# Patient Record
Sex: MALE | Race: WHITE | URBAN - METROPOLITAN AREA
[De-identification: names, ages, dates, MRNs, and addresses within clinical notes are randomized per-mention and may not be internally consistent; named-entity substitution may affect disease eponyms.]

---

## 2023-07-21 ENCOUNTER — TELEPHONE (OUTPATIENT)
Dept: GASTROENTEROLOGY | Facility: CLINIC | Age: 63
End: 2023-07-21

## 2023-07-21 RX ORDER — TRAZODONE HYDROCHLORIDE 50 MG/1
50 TABLET ORAL
COMMUNITY
Start: 2023-07-07

## 2023-07-21 RX ORDER — PREDNISONE 10 MG/1
TABLET ORAL
COMMUNITY
Start: 2023-07-07

## 2023-07-21 RX ORDER — CLONAZEPAM 1 MG/1
1 TABLET ORAL EVERY 6 HOURS PRN
COMMUNITY
Start: 2023-06-01

## 2023-07-21 RX ORDER — PANTOPRAZOLE SODIUM 40 MG/1
40 TABLET, DELAYED RELEASE ORAL EVERY MORNING
COMMUNITY
Start: 2023-06-01

## 2023-07-21 RX ORDER — ESCITALOPRAM OXALATE 5 MG/1
5 TABLET ORAL
COMMUNITY
Start: 2023-06-01

## 2023-08-03 ENCOUNTER — CONSULT (OUTPATIENT)
Dept: GASTROENTEROLOGY | Facility: CLINIC | Age: 63
End: 2023-08-03
Payer: COMMERCIAL

## 2023-08-03 ENCOUNTER — TELEPHONE (OUTPATIENT)
Dept: GASTROENTEROLOGY | Facility: CLINIC | Age: 63
End: 2023-08-03

## 2023-08-03 VITALS
BODY MASS INDEX: 22.24 KG/M2 | WEIGHT: 138.4 LBS | DIASTOLIC BLOOD PRESSURE: 78 MMHG | HEIGHT: 66 IN | SYSTOLIC BLOOD PRESSURE: 120 MMHG

## 2023-08-03 DIAGNOSIS — R63.0 LOSS OF APPETITE: ICD-10-CM

## 2023-08-03 DIAGNOSIS — R63.4 UNINTENTIONAL WEIGHT LOSS: ICD-10-CM

## 2023-08-03 DIAGNOSIS — Z98.890 HISTORY OF COLONOSCOPY: ICD-10-CM

## 2023-08-03 DIAGNOSIS — R19.4 CHANGE IN BOWEL HABITS: ICD-10-CM

## 2023-08-03 DIAGNOSIS — R11.0 NAUSEA: Primary | ICD-10-CM

## 2023-08-03 DIAGNOSIS — R10.10 PAIN OF UPPER ABDOMEN: ICD-10-CM

## 2023-08-03 PROCEDURE — 99204 OFFICE O/P NEW MOD 45 MIN: CPT | Performed by: NURSE PRACTITIONER

## 2023-08-03 RX ORDER — POLYETHYLENE GLYCOL 3350, SODIUM SULFATE ANHYDROUS, SODIUM BICARBONATE, SODIUM CHLORIDE, POTASSIUM CHLORIDE 236; 22.74; 6.74; 5.86; 2.97 G/4L; G/4L; G/4L; G/4L; G/4L
4000 POWDER, FOR SOLUTION ORAL ONCE
Qty: 4000 ML | Refills: 0 | Status: SHIPPED | OUTPATIENT
Start: 2023-08-03 | End: 2023-08-10 | Stop reason: HOSPADM

## 2023-08-03 RX ORDER — SUCRALFATE 1 G/1
TABLET ORAL
COMMUNITY
Start: 2023-06-05

## 2023-08-03 NOTE — TELEPHONE ENCOUNTER
Scheduled date of colonoscopy (as of today):8/10/23  Physician performing colonoscopy:RADHA  Location of colonoscopy:[facility]  Bowel prep reviewed with patient:Bi  Instructions reviewed with patient by:BRENNEN  Clearances: N

## 2023-08-03 NOTE — PROGRESS NOTES
[facility] - Outpatient Consultation  Bety Dye 65 y.o. male MRN: [id number]  Encounter: [id number]    ASSESSMENT AND PLAN:      1. Pain of upper abdomen  2. Nausea  Patient states he is having intermittent nausea with abdominal pain to his upper abdomen area that radiates to the left and right flank. Patient states he has increased discomfort with lying down. Patient does have history of gastritis noted on EGD a few years ago at [facility].  No record on EMR. Lab work normal. Patient's current setting with symptoms and 40+ pound weight loss in 4 months, consider gastritis, gastric versus peptic ulcer, ?malignancy. Patient states symptoms do feel slightly better with Carafate 1 g twice daily and omeprazole 20 mg daily. Evaluating both colonoscopy and EGD. He states he did have a cardiac work-up which was normal.    - Frequent small meals  - EGD scheduled at [facility]    3. Loss of appetite  4. Unintentional weight loss  Patient states he has had loss of appetite and unintentional weight loss for approximately 4 to 5 months. Believes he is lost approximately 42 pounds. States he cannot finish a meal.  Sooner malabsorption versus malnutrition, ? Malignancy. -EGD/colonoscopy scheduled at [facility]    5. Change in bowel habit  Patient states he is having daily or every other day bowel movements however states his bowel movements are soft and he believes thinner in caliber than previously. He denies hematochezia or melena. Scheduling for colonoscopy. 6. History of colonoscopy  Colonoscopy 2013; 5-year recall. Patient has opted to do Cologuard x2 with the last one being 11/2022 and negative. - Colonoscopy scheduled at [facility]; Future  - polyethylene glycol (Golytely) 4000 mL solution; Take 4,000 mL by mouth once for 1 dose Take 4000 mL by mouth once for 1 dose.  Use as directed  Dispense: 4000 mL; Refill: 0      Followup Appointment: 3 to 4 weeks after procedure  ______________________________________________________________________    Chief Complaint   Patient presents with   • Decreased Appetite     Pt states that he has had a decreased appetite for last 5 months. Also experiencing extreme fatigue and pain on his sides when laying down. He forces himself to eat something everyday. HPI:   Clint López is a 65y.o. year old male referred by PCP for increased weight loss, decreased appetite, nausea, abdominal discomfort and fatigue. On exam, patient intermittent nausea with eating, denies nausea vomiting. Denies any acid reflux symptoms however states since he has been taking omeprazole 20 mg daily and Carafate 1 g twice daily his symptoms have felt a little better. Denies abdominal pain on exam however states when he lies down he has increased abdominal discomfort that actually radiates bilaterally to his sides. It also gives him increased chest discomfort. Patient has been seen by the cardiologist and cleared. Patient states he is having soft daily or every other day bowel movements however states the caliber is thinner. Denies hematochezia or melena. Non-smoker, denies EtOH use. Patient states he has lost approximately 42 pounds in the last 4 months. Patient did have blood work completed by his PCP which essentially was negative, CBC, CMP, TSH, CRP.;  Chest x-ray essentially was normal per patient and also MRI was completed and patient states he was told it was normal as well. Patient does have a history of having an EGD he states approximately 9 years ago that noted gastritis. Colonoscopy 2013 by Dr. Bernardino Aldridge at [facility] noted internal hemorrhoid, poor prep and was requested for 5-year follow-up. Patient decided to do Cologuard instead. He has done Cologuard twice last one being 11/2022 that was negative.     Historical Information   Past Medical History:   Diagnosis Date   • Anxiety 07/21/2023    from PCP   • Bloating 07/21/2023 per PCP   • Change in bowel habits     per PCP   • Elevated LFTs 07/21/2023    from PCP   • High cholesterol 07/21/2023    from PCP   • Stomach ulcer 07/21/2023    from PCP     Past Surgical History:   Procedure Laterality Date   • HYDROCELE EXCISION / REPAIR Right 10/16/2021    testicular hydrocele/epididymitis   • SALIVARY GLAND SURGERY  10/2018    [facility]     Social History     Substance and Sexual Activity   Alcohol Use Never     Social History     Substance and Sexual Activity   Drug Use Never     Social History     Tobacco Use   Smoking Status Never   Smokeless Tobacco Never     Family History   Problem Relation Age of Onset   • No Known Problems Mother    • No Known Problems Father        Meds/Allergies     Current Outpatient Medications:   •  clonazePAM (KlonoPIN) 1 mg tablet  •  polyethylene glycol (Golytely) 4000 mL solution  •  sucralfate (CARAFATE) 1 g tablet  •  escitalopram (LEXAPRO) 5 mg tablet  •  pantoprazole (PROTONIX) 40 mg tablet  •  predniSONE 10 mg tablet  •  traZODone (DESYREL) 50 mg tablet    No Known Allergies    PHYSICAL EXAM:    Blood pressure 120/78, height 5' 6" (1.676 m), weight 62.8 kg (138 lb 6.4 oz). Body mass index is 22.34 kg/m². General Appearance: NAD, cooperative, alert, malnourished  Eyes: Anicteric, PERRLA, EOMI  ENT:  Normocephalic, atraumatic, normal mucosa. Neck:  Supple, symmetrical, trachea midline,   Resp:  Clear to auscultation bilaterally; no rales, rhonchi or wheezing; respirations unlabored   CV:  S1 S2, Regular rate and rhythm; no murmur, rub, or gallop. GI:  Soft, non-tender, non-distended; normal bowel sounds; no masses, no organomegaly   Rectal: Deferred  Musculoskeletal: No cyanosis, clubbing or edema. Normal ROM.   Skin:  No jaundice, rashes, or lesions   Heme/Lymph: No palpable cervical lymphadenopathy  Psych: Normal affect, good eye contact  Neuro: No gross deficits, AAOx3    Lab Results:   No results found for: "WBC", "HGB", "HCT", "MCV", "PLT"  No results found for: "NA", "K", "CL", "CO2", "ANIONGAP", "BUN", "CREATININE", "GLUCOSE", "GLUF", "CALCIUM", "Beth Lapidus", "AST", "ALT", "ALKPHOS", "PROT", "BILITOT", "EGFR"  No results found for: "IRON", "TIBC", "FERRITIN"  No results found for: "LIPASE"    Radiology Results:   No results found. REVIEW OF SYSTEMS:    CONSTITUTIONAL: Denies any fever, chills, rigors, and weight loss. HEENT: No earache or tinnitus. Denies hearing loss or visual disturbances. CARDIOVASCULAR: No chest pain or palpitations. RESPIRATORY: Denies any cough, hemoptysis, shortness of breath or dyspnea on exertion. GASTROINTESTINAL: As noted in the History of Present Illness. GENITOURINARY: No problems with urination. Denies any hematuria or dysuria. NEUROLOGIC: No dizziness or vertigo, denies headaches. MUSCULOSKELETAL: Denies any muscle or joint pain. SKIN: Denies skin rashes or itching. ENDOCRINE: Denies excessive thirst. Denies intolerance to heat or cold. PSYCHOSOCIAL: Denies depression or anxiety. Denies any recent memory loss.

## 2023-08-03 NOTE — H&P (VIEW-ONLY)
[facility] - Outpatient Consultation  Bety Dye 65 y.o. male MRN: [id number]  Encounter: [id number]    ASSESSMENT AND PLAN:      1. Pain of upper abdomen  2. Nausea  Patient states he is having intermittent nausea with abdominal pain to his upper abdomen area that radiates to the left and right flank. Patient states he has increased discomfort with lying down. Patient does have history of gastritis noted on EGD a few years ago at [facility].  No record on EMR. Lab work normal. Patient's current setting with symptoms and 40+ pound weight loss in 4 months, consider gastritis, gastric versus peptic ulcer, ?malignancy. Patient states symptoms do feel slightly better with Carafate 1 g twice daily and omeprazole 20 mg daily. Evaluating both colonoscopy and EGD. He states he did have a cardiac work-up which was normal.    - Frequent small meals  - EGD scheduled at [facility]    3. Loss of appetite  4. Unintentional weight loss  Patient states he has had loss of appetite and unintentional weight loss for approximately 4 to 5 months. Believes he is lost approximately 42 pounds. States he cannot finish a meal.  Sooner malabsorption versus malnutrition, ? Malignancy. -EGD/colonoscopy scheduled at [facility]    5. Change in bowel habit  Patient states he is having daily or every other day bowel movements however states his bowel movements are soft and he believes thinner in caliber than previously. He denies hematochezia or melena. Scheduling for colonoscopy. 6. History of colonoscopy  Colonoscopy 2013; 5-year recall. Patient has opted to do Cologuard x2 with the last one being 11/2022 and negative. - Colonoscopy scheduled at [facility]; Future  - polyethylene glycol (Golytely) 4000 mL solution; Take 4,000 mL by mouth once for 1 dose Take 4000 mL by mouth once for 1 dose.  Use as directed  Dispense: 4000 mL; Refill: 0      Followup Appointment: 3 to 4 weeks after procedure  ______________________________________________________________________    Chief Complaint   Patient presents with   • Decreased Appetite     Pt states that he has had a decreased appetite for last 5 months. Also experiencing extreme fatigue and pain on his sides when laying down. He forces himself to eat something everyday. HPI:   Jie Bnoilla is a 65y.o. year old male referred by PCP for increased weight loss, decreased appetite, nausea, abdominal discomfort and fatigue. On exam, patient intermittent nausea with eating, denies nausea vomiting. Denies any acid reflux symptoms however states since he has been taking omeprazole 20 mg daily and Carafate 1 g twice daily his symptoms have felt a little better. Denies abdominal pain on exam however states when he lies down he has increased abdominal discomfort that actually radiates bilaterally to his sides. It also gives him increased chest discomfort. Patient has been seen by the cardiologist and cleared. Patient states he is having soft daily or every other day bowel movements however states the caliber is thinner. Denies hematochezia or melena. Non-smoker, denies EtOH use. Patient states he has lost approximately 42 pounds in the last 4 months. Patient did have blood work completed by his PCP which essentially was negative, CBC, CMP, TSH, CRP.;  Chest x-ray essentially was normal per patient and also MRI was completed and patient states he was told it was normal as well. Patient does have a history of having an EGD he states approximately 9 years ago that noted gastritis. Colonoscopy 2013 by Dr. Kermit Lewis at [facility] noted internal hemorrhoid, poor prep and was requested for 5-year follow-up. Patient decided to do Cologuard instead. He has done Cologuard twice last one being 11/2022 that was negative.     Historical Information   Past Medical History:   Diagnosis Date   • Anxiety 07/21/2023    from PCP   • Bloating 07/21/2023 per PCP   • Change in bowel habits     per PCP   • Elevated LFTs 07/21/2023    from PCP   • High cholesterol 07/21/2023    from PCP   • Stomach ulcer 07/21/2023    from PCP     Past Surgical History:   Procedure Laterality Date   • HYDROCELE EXCISION / REPAIR Right 10/16/2021    testicular hydrocele/epididymitis   • SALIVARY GLAND SURGERY  10/2018    [facility]     Social History     Substance and Sexual Activity   Alcohol Use Never     Social History     Substance and Sexual Activity   Drug Use Never     Social History     Tobacco Use   Smoking Status Never   Smokeless Tobacco Never     Family History   Problem Relation Age of Onset   • No Known Problems Mother    • No Known Problems Father        Meds/Allergies     Current Outpatient Medications:   •  clonazePAM (KlonoPIN) 1 mg tablet  •  polyethylene glycol (Golytely) 4000 mL solution  •  sucralfate (CARAFATE) 1 g tablet  •  escitalopram (LEXAPRO) 5 mg tablet  •  pantoprazole (PROTONIX) 40 mg tablet  •  predniSONE 10 mg tablet  •  traZODone (DESYREL) 50 mg tablet    No Known Allergies    PHYSICAL EXAM:    Blood pressure 120/78, height 5' 6" (1.676 m), weight 62.8 kg (138 lb 6.4 oz). Body mass index is 22.34 kg/m². General Appearance: NAD, cooperative, alert, malnourished  Eyes: Anicteric, PERRLA, EOMI  ENT:  Normocephalic, atraumatic, normal mucosa. Neck:  Supple, symmetrical, trachea midline,   Resp:  Clear to auscultation bilaterally; no rales, rhonchi or wheezing; respirations unlabored   CV:  S1 S2, Regular rate and rhythm; no murmur, rub, or gallop. GI:  Soft, non-tender, non-distended; normal bowel sounds; no masses, no organomegaly   Rectal: Deferred  Musculoskeletal: No cyanosis, clubbing or edema. Normal ROM.   Skin:  No jaundice, rashes, or lesions   Heme/Lymph: No palpable cervical lymphadenopathy  Psych: Normal affect, good eye contact  Neuro: No gross deficits, AAOx3    Lab Results:   No results found for: "WBC", "HGB", "HCT", "MCV", "PLT"  No results found for: "NA", "K", "CL", "CO2", "ANIONGAP", "BUN", "CREATININE", "GLUCOSE", "GLUF", "CALCIUM", "Ligia Balsam", "AST", "ALT", "ALKPHOS", "PROT", "BILITOT", "EGFR"  No results found for: "IRON", "TIBC", "FERRITIN"  No results found for: "LIPASE"    Radiology Results:   No results found. REVIEW OF SYSTEMS:    CONSTITUTIONAL: Denies any fever, chills, rigors, and weight loss. HEENT: No earache or tinnitus. Denies hearing loss or visual disturbances. CARDIOVASCULAR: No chest pain or palpitations. RESPIRATORY: Denies any cough, hemoptysis, shortness of breath or dyspnea on exertion. GASTROINTESTINAL: As noted in the History of Present Illness. GENITOURINARY: No problems with urination. Denies any hematuria or dysuria. NEUROLOGIC: No dizziness or vertigo, denies headaches. MUSCULOSKELETAL: Denies any muscle or joint pain. SKIN: Denies skin rashes or itching. ENDOCRINE: Denies excessive thirst. Denies intolerance to heat or cold. PSYCHOSOCIAL: Denies depression or anxiety. Denies any recent memory loss.

## 2023-08-10 ENCOUNTER — ANESTHESIA EVENT (OUTPATIENT)
Dept: GASTROENTEROLOGY | Facility: AMBULATORY SURGERY CENTER | Age: 63
End: 2023-08-10

## 2023-08-10 ENCOUNTER — HOSPITAL ENCOUNTER (OUTPATIENT)
Dept: GASTROENTEROLOGY | Facility: AMBULATORY SURGERY CENTER | Age: 63
Discharge: HOME | End: 2023-08-10
Payer: COMMERCIAL

## 2023-08-10 ENCOUNTER — ANESTHESIA (OUTPATIENT)
Dept: GASTROENTEROLOGY | Facility: AMBULATORY SURGERY CENTER | Age: 63
End: 2023-08-10

## 2023-08-10 VITALS
WEIGHT: 128 LBS | TEMPERATURE: 97.6 F | BODY MASS INDEX: 20.57 KG/M2 | OXYGEN SATURATION: 98 % | DIASTOLIC BLOOD PRESSURE: 71 MMHG | SYSTOLIC BLOOD PRESSURE: 107 MMHG | RESPIRATION RATE: 18 BRPM | HEART RATE: 66 BPM | HEIGHT: 66 IN

## 2023-08-10 DIAGNOSIS — R11.0 NAUSEA: ICD-10-CM

## 2023-08-10 DIAGNOSIS — R63.4 UNINTENTIONAL WEIGHT LOSS: ICD-10-CM

## 2023-08-10 DIAGNOSIS — Z98.890 HISTORY OF COLONOSCOPY: ICD-10-CM

## 2023-08-10 DIAGNOSIS — R10.10 PAIN OF UPPER ABDOMEN: ICD-10-CM

## 2023-08-10 PROBLEM — F41.9 ANXIETY: Status: ACTIVE | Noted: 2023-07-21

## 2023-08-10 PROCEDURE — 43239 EGD BIOPSY SINGLE/MULTIPLE: CPT | Performed by: INTERNAL MEDICINE

## 2023-08-10 PROCEDURE — 45378 DIAGNOSTIC COLONOSCOPY: CPT | Performed by: INTERNAL MEDICINE

## 2023-08-10 RX ORDER — SODIUM CHLORIDE, SODIUM LACTATE, POTASSIUM CHLORIDE, CALCIUM CHLORIDE 600; 310; 30; 20 MG/100ML; MG/100ML; MG/100ML; MG/100ML
50 INJECTION, SOLUTION INTRAVENOUS CONTINUOUS
Status: DISCONTINUED | OUTPATIENT
Start: 2023-08-10 | End: 2023-08-14 | Stop reason: HOSPADM

## 2023-08-10 RX ORDER — PROPOFOL 10 MG/ML
INJECTION, EMULSION INTRAVENOUS AS NEEDED
Status: DISCONTINUED | OUTPATIENT
Start: 2023-08-10 | End: 2023-08-10

## 2023-08-10 RX ORDER — OMEPRAZOLE 20 MG/1
20 TABLET, DELAYED RELEASE ORAL DAILY
COMMUNITY

## 2023-08-10 RX ADMIN — SODIUM CHLORIDE, SODIUM LACTATE, POTASSIUM CHLORIDE, CALCIUM CHLORIDE 50 ML/HR: 600; 310; 30; 20 INJECTION, SOLUTION INTRAVENOUS at 13:03

## 2023-08-10 RX ADMIN — PROPOFOL 50 MG: 10 INJECTION, EMULSION INTRAVENOUS at 13:09

## 2023-08-10 RX ADMIN — PROPOFOL 50 MG: 10 INJECTION, EMULSION INTRAVENOUS at 13:13

## 2023-08-10 RX ADMIN — PROPOFOL 130 MG: 10 INJECTION, EMULSION INTRAVENOUS at 13:06

## 2023-08-10 NOTE — INTERVAL H&P NOTE
H&P reviewed. After examining the patient I find no changes in the patients condition since the H&P had been written.     Vitals:    08/10/23 1251   BP: 109/65   Pulse: 69   Resp: 15   Temp:    SpO2: 97%

## 2023-08-10 NOTE — ANESTHESIA POSTPROCEDURE EVALUATION
Post-Op Assessment Note    CV Status:  Stable  Pain Score: 0    Pain management: adequate     Mental Status:  Alert and awake   Hydration Status:  Euvolemic   PONV Controlled:  Controlled   Airway Patency:  Patent      Post Op Vitals Reviewed: Yes      Staff: Anesthesiologist, CRNA         No notable events documented.     BP   103/63   Temp      Pulse  60   Resp   18   SpO2   100

## 2023-08-10 NOTE — ANESTHESIA PREPROCEDURE EVALUATION
Procedure:  COLONOSCOPY  EGD    Relevant Problems   NEURO/PSYCH   (+) Anxiety        Physical Exam    Airway    Mallampati score: I  TM Distance: >3 FB  Neck ROM: full     Dental   No notable dental hx     Cardiovascular  Cardiovascular exam normal    Pulmonary  Pulmonary exam normal     Other Findings        Anesthesia Plan  ASA Score- 2     Anesthesia Type- IV sedation with anesthesia with ASA Monitors. Additional Monitors:   Airway Plan:     Comment: I discussed risks (reviewed with patient on the anesthesia consent form), benefits and alternatives of monitored sedation including the possibility under sedation to have recall or mild discomfort. .       Plan Factors-    Chart reviewed. Patient summary reviewed. Induction- intravenous. Postoperative Plan-     Informed Consent- Anesthetic plan and risks discussed with patient. I personally reviewed this patient with the CRNA. Discussed and agreed on the Anesthesia Plan with the CRNA. Ramon Cervantes

## 2023-08-14 ENCOUNTER — TELEPHONE (OUTPATIENT)
Dept: GASTROENTEROLOGY | Facility: CLINIC | Age: 63
End: 2023-08-14

## 2023-08-14 NOTE — TELEPHONE ENCOUNTER
Spoke to patient to schedule repeat colonoscopy. He asked to be called back in about 1 month to schedule the procedure sometime in December.

## 2023-09-08 ENCOUNTER — PREP FOR PROCEDURE (OUTPATIENT)
Dept: GASTROENTEROLOGY | Facility: CLINIC | Age: 63
End: 2023-09-08

## 2023-09-08 DIAGNOSIS — Z98.890 HISTORY OF COLONOSCOPY: Primary | ICD-10-CM

## 2023-09-08 DIAGNOSIS — R63.4 UNINTENTIONAL WEIGHT LOSS: ICD-10-CM

## 2023-10-13 ENCOUNTER — NURSE TRIAGE (OUTPATIENT)
Age: 63
End: 2023-10-13

## 2023-10-13 ENCOUNTER — OFFICE VISIT (OUTPATIENT)
Dept: GASTROENTEROLOGY | Facility: CLINIC | Age: 63
End: 2023-10-13
Payer: COMMERCIAL

## 2023-10-13 ENCOUNTER — HOSPITAL ENCOUNTER (OUTPATIENT)
Dept: CT IMAGING | Facility: HOSPITAL | Age: 63
Discharge: HOME | End: 2023-10-13
Payer: COMMERCIAL

## 2023-10-13 VITALS
DIASTOLIC BLOOD PRESSURE: 82 MMHG | SYSTOLIC BLOOD PRESSURE: 114 MMHG | BODY MASS INDEX: 19.93 KG/M2 | WEIGHT: 124 LBS | HEIGHT: 66 IN

## 2023-10-13 DIAGNOSIS — R63.4 UNINTENTIONAL WEIGHT LOSS: ICD-10-CM

## 2023-10-13 DIAGNOSIS — R10.10 PAIN OF UPPER ABDOMEN: ICD-10-CM

## 2023-10-13 DIAGNOSIS — R63.0 LOSS OF APPETITE: ICD-10-CM

## 2023-10-13 DIAGNOSIS — Z98.890 HX OF COLONOSCOPY: ICD-10-CM

## 2023-10-13 DIAGNOSIS — K21.9 GASTROESOPHAGEAL REFLUX DISEASE WITHOUT ESOPHAGITIS: Primary | ICD-10-CM

## 2023-10-13 PROCEDURE — G1004 CDSM NDSC: HCPCS

## 2023-10-13 PROCEDURE — 71260 CT THORAX DX C+: CPT

## 2023-10-13 PROCEDURE — 74177 CT ABD & PELVIS W/CONTRAST: CPT

## 2023-10-13 PROCEDURE — 99214 OFFICE O/P EST MOD 30 MIN: CPT | Performed by: NURSE PRACTITIONER

## 2023-10-13 RX ORDER — PANTOPRAZOLE SODIUM 20 MG/1
20 TABLET, DELAYED RELEASE ORAL DAILY
COMMUNITY
Start: 2023-09-20

## 2023-10-13 RX ORDER — HYDROXYCHLOROQUINE SULFATE 200 MG/1
200 TABLET, FILM COATED ORAL DAILY
COMMUNITY
Start: 2023-10-11

## 2023-10-13 RX ORDER — SUCRALFATE 1 G/1
1 TABLET ORAL
Qty: 120 TABLET | Refills: 4 | Status: SHIPPED | OUTPATIENT
Start: 2023-10-13

## 2023-10-13 RX ORDER — PREDNISONE 10 MG/1
40 TABLET ORAL DAILY
COMMUNITY

## 2023-10-13 RX ADMIN — IOHEXOL 100 ML: 350 INJECTION, SOLUTION INTRAVENOUS at 14:47

## 2023-10-13 RX ADMIN — IOHEXOL 50 ML: 240 INJECTION, SOLUTION INTRATHECAL; INTRAVASCULAR; INTRAVENOUS; ORAL at 14:47

## 2023-10-13 NOTE — PROGRESS NOTES
[facility] - Outpatient Follow-up Note  Ray Howard 59 y.o. male MRN: [id number]  Encounter: [id number]    ASSESSMENT AND PLAN:      1. Gastroesophageal reflux disease without esophagitis  Patient states he continues to have acid reflux symptoms. He was switched to Protonix 20 mg daily by PCP. States he was not taking Carafate. Consider GERD, functional dyspepsia. - sucralfate (CARAFATE) 1 g tablet; Take 1 tablet (1 g total) by mouth 4 (four) times a day (with meals and at bedtime)  Dispense: 120 tablet; Refill: 4    2. Unintentional weight loss  3. Loss of appetite  Patient continues to have increased unintentional weight loss. With previous office visit he had lost 40 pounds. In 2 months he has lost another 24 total of approximately 60 pounds in 6 months. Discussed with patient importance of getting calories through protein supplementation. Due to lack of ability to complete colonoscopy, order CT chest abdomen and pelvis. Consider malabsorption, malnutrition, malignancy. - CT chest abdomen pelvis w wo contrast; Future    4. Pain of upper abdomen  On exam patient does continue to have upper abdominal tenderness with palpation. He states discomfort comes and goes. Unfortunately unable to complete colonoscopy that was previously requested. In patient's current setting with increased weight loss, loss of appetite, abdominal pain, requesting stat CT which [facility]. 5. Hx of colonoscopy  Patient has had 2 colonoscopies both with poor preps. We will evaluate follow-up colonoscopy with results of CT. Patient will be required to do standard prep.       Followup Appointment: Pending results of CTA  ______________________________________________________________________    Chief Complaint   Patient presents with    Stomach pain, meds not working     HPI: 28-year-old male with past medical history of significant unintentional weight loss, decreased appetite, nausea, fatigue and abdominal pain. Patient presents to the office for follow-up abdominal pain, patient presents cachectic. Since last office visit in August patient has lost an additional 20 pounds. Patient has a total weight loss of approximately 60 pounds over 6 months. Increased loss of appetite, upper right and left abdominal tenderness with palpation. On exam, patient denies nausea or vomiting. States his abdominal pain comes and goes. States he is having decreased appetite. Encourage patient to use protein supplements 2-3 times per day. EGD 8/10/2023 noted some mild erythematous in the antrum. Colonoscopy could not be completed due to poor prep. Patient is scheduled for 12/27 for follow-up colonoscopy. Requested that he try to schedule that sooner. Patient states that he was sent to another provider possibly rheumatologist who states he may have lupus. Cannot find documentation on the EMR. Increased concern for increased ongoing weight loss without cause. Discussed patient with Dr. Kenia Owens and requesting CT scan stat. Patient will go to [address] today and to have imaging completed. Follow-up colonoscopy will be scheduled as soon as we get the results of the CT. Patient will be required to do stented bowel prep. Dr. Duane Bingham had requested MiraLAX daily for 1 week prior to colonoscopy.     Historical Information   Past Medical History:   Diagnosis Date    Anxiety 07/21/2023    from PCP    Bloating 07/21/2023    per PCP    Change in bowel habits     per PCP    Elevated LFTs 07/21/2023    from PCP    High cholesterol 07/21/2023    from PCP    Stomach ulcer 07/21/2023    from PCP     Past Surgical History:   Procedure Laterality Date    COLONOSCOPY      EGD      HYDROCELE EXCISION / REPAIR Right 10/16/2021    testicular hydrocele/epididymitis    SALIVARY GLAND SURGERY  10/2018    [facility]     Social History     Substance and Sexual Activity   Alcohol Use Never     Social History     Substance and Sexual Activity   Drug Use Never     Social History     Tobacco Use   Smoking Status Never   Smokeless Tobacco Never     Family History   Problem Relation Age of Onset    No Known Problems Mother     No Known Problems Father          Current Outpatient Medications:     clonazePAM (KlonoPIN) 1 mg tablet    hydroxychloroquine (PLAQUENIL) 200 mg tablet    pantoprazole (PROTONIX) 20 mg tablet    predniSONE 10 mg tablet    sucralfate (CARAFATE) 1 g tablet  No Known Allergies  Reviewed medications and allergies and updated as indicated    PHYSICAL EXAM:    Blood pressure 114/82, height 5' 6" (1.676 m), weight 56.2 kg (124 lb). Body mass index is 20.01 kg/m². General Appearance: NAD, cooperative, alert, cachectic   Eyes: Anicteric, PERRLA, EOMI  ENT:  Normocephalic, atraumatic, normal mucosa. Neck:  Supple, symmetrical, trachea midline  Resp:  Clear to auscultation bilaterally; no rales, rhonchi or wheezing; respirations unlabored   CV:  S1 S2, Regular rate and rhythm; no murmur, rub, or gallop. GI:  Soft, upper abdominal tenderness with palpation, non-distended; normal bowel sounds; no masses, no organomegaly , loss of appetite  Rectal: Deferred  Musculoskeletal: No cyanosis, clubbing or edema. Normal ROM. Skin:  No jaundice, rashes, or lesions   Heme/Lymph: No palpable cervical lymphadenopathy  Psych: Normal affect, good eye contact  Neuro: No gross deficits, AAOx3    Lab Results:   No results found for: "WBC", "HGB", "HCT", "MCV", "PLT"  No results found for: "NA", "K", "CL", "CO2", "ANIONGAP", "BUN", "CREATININE", "GLUCOSE", "GLUF", "CALCIUM", "CORRECTEDCA", "AST", "ALT", "ALKPHOS", "PROT", "BILITOT", "EGFR"  No results found for: "IRON", "TIBC", "FERRITIN"  No results found for: "LIPASE"    Radiology Results:   No results found.

## 2023-10-13 NOTE — TELEPHONE ENCOUNTER
Regarding: Order clarification- pt scheduled today  ----- Message from [place] Rakers sent at 10/13/2023 12:53 PM EDT -----  Savannah from [address] [place] [address] is requesting a call asap regarding the CTA ordered by Becki Tapia. She believes this was ordered in error and that he meant to order a CT Abdomen.

## 2023-10-13 NOTE — TELEPHONE ENCOUNTER
I spoke with Savannah at [address] [place] [address] and confirmed order has already been amended to CT abd/pelvis with contrast.

## 2023-10-16 ENCOUNTER — TELEPHONE (OUTPATIENT)
Age: 63
End: 2023-10-16

## 2023-10-16 NOTE — TELEPHONE ENCOUNTER
Patients GI provider:  PA: Rocael Cason     Number to return call: [phone]     Reason for call: Pt calling requesting for a doctors note for this week will go back to work on Monday. Please reach out to pt.      Scheduled procedure/appointment date if applicable: Apt/procedure 12-

## 2023-10-16 NOTE — TELEPHONE ENCOUNTER
Patients GI provider:  MARGAUX Melendrez    Number to return call: [phone]    Reason for call: Deena Escalante from [facility] office calling to request report for CT scan results be faxed over. Fax# [phone].  Please fax to attn Dr. Anali Rg    Scheduled procedure/appointment date if applicable: N/A

## 2023-10-19 NOTE — TELEPHONE ENCOUNTER
Left message at phone number requested on EMR. Requesting work excuse for time period he was seen in the office which was 10/13. Requested that patient either MyChart or contact the office with the actual dating that is requested and a possible fax number, name and location that the request is to be forwarded to.

## 2023-12-13 ENCOUNTER — ANESTHESIA EVENT (OUTPATIENT)
Dept: ANESTHESIOLOGY | Facility: AMBULATORY SURGERY CENTER | Age: 63
End: 2023-12-13

## 2023-12-13 ENCOUNTER — ANESTHESIA (OUTPATIENT)
Dept: ANESTHESIOLOGY | Facility: AMBULATORY SURGERY CENTER | Age: 63
End: 2023-12-13

## 2023-12-14 ENCOUNTER — TELEPHONE (OUTPATIENT)
Age: 63
End: 2023-12-14

## 2023-12-14 ENCOUNTER — NURSE TRIAGE (OUTPATIENT)
Age: 63
End: 2023-12-14

## 2023-12-14 NOTE — TELEPHONE ENCOUNTER
Patients GI provider:  Dr. Pak Mems    Number to return call: [phone]    Reason for call: Pt returned our call but couldn't remember who called. The message said that they needed to go over a few things with him. Scheduled for colon 12/27/23.  Please reach out to pt    Scheduled procedure/appointment date if applicable: procedure 65/81/56

## 2023-12-15 ENCOUNTER — TELEPHONE (OUTPATIENT)
Age: 63
End: 2023-12-15

## 2023-12-15 DIAGNOSIS — Z12.11 COLON CANCER SCREENING: Primary | ICD-10-CM

## 2023-12-15 NOTE — TELEPHONE ENCOUNTER
Golytely ordered. Pt also to do Miralax 17 gm daily for 7 days prior. Called pt to remind him of daily Miralax prior.

## 2023-12-15 NOTE — TELEPHONE ENCOUNTER
12/15/23  Screened by: Stephen Barrett    Referring Provider     Pre- Screening: There is no height or weight on file to calculate BMI. Has patient been referred for a routine screening Colonoscopy? yes  Is the patient between 43-73 years old? yes      Previous Colonoscopy yes   If yes:    Date: 8/3/23    Facility:     Reason:       SCHEDULING STAFF: If the patient is between 39yrs-51yrs, please advise patient to confirm benefits/coverage with their insurance company for a routine screening colonoscopy, some insurance carriers will only cover at [address] [place] [address] or older. If the patient is over 66years old, please schedule an office visit. Does the patient want to see a Gastroenterologist prior to their procedure OR are they having any GI symptoms? no    Has the patient been hospitalized or had abdominal surgery in the past 6 months? no    Does the patient use supplemental oxygen? no    Does the patient take Coumadin, Lovenox, Plavix, Elliquis, Xarelto, or other blood thinning medication? no    Has the patient had a stroke, cardiac event, or stent placed in the past year? no    SCHEDULING STAFF: If patient answers NO to above questions, then schedule procedure. If patient answers YES to above questions, then schedule office appointment. PT PASSED OA    If patient is between 45yrs - 49yrs, please advise patient that we will have to confirm benefits & coverage with their insurance company for a routine screening colonoscopy.

## 2023-12-22 ENCOUNTER — TELEPHONE (OUTPATIENT)
Dept: GASTROENTEROLOGY | Facility: AMBULATORY SURGERY CENTER | Age: 63
End: 2023-12-22

## 2023-12-22 NOTE — TELEPHONE ENCOUNTER
Call [facility] dept. Pt returning call. Per chart review, [facility], will connect pt with their dept.

## 2023-12-27 ENCOUNTER — ANESTHESIA EVENT (OUTPATIENT)
Dept: GASTROENTEROLOGY | Facility: AMBULATORY SURGERY CENTER | Age: 63
End: 2023-12-27

## 2023-12-27 ENCOUNTER — HOSPITAL ENCOUNTER (OUTPATIENT)
Dept: GASTROENTEROLOGY | Facility: AMBULATORY SURGERY CENTER | Age: 63
Discharge: HOME | End: 2023-12-27
Payer: COMMERCIAL

## 2023-12-27 ENCOUNTER — ANESTHESIA (OUTPATIENT)
Dept: GASTROENTEROLOGY | Facility: AMBULATORY SURGERY CENTER | Age: 63
End: 2023-12-27

## 2023-12-27 VITALS
SYSTOLIC BLOOD PRESSURE: 127 MMHG | TEMPERATURE: 97.1 F | WEIGHT: 145 LBS | DIASTOLIC BLOOD PRESSURE: 72 MMHG | HEIGHT: 66 IN | RESPIRATION RATE: 20 BRPM | HEART RATE: 59 BPM | OXYGEN SATURATION: 99 % | BODY MASS INDEX: 23.3 KG/M2

## 2023-12-27 DIAGNOSIS — Z98.890 HISTORY OF COLONOSCOPY: ICD-10-CM

## 2023-12-27 DIAGNOSIS — R63.4 UNINTENTIONAL WEIGHT LOSS: ICD-10-CM

## 2023-12-27 PROBLEM — M32.9 LUPUS (HCC): Status: ACTIVE | Noted: 2023-12-27

## 2023-12-27 PROCEDURE — 45378 DIAGNOSTIC COLONOSCOPY: CPT | Performed by: INTERNAL MEDICINE

## 2023-12-27 RX ORDER — PROPOFOL 10 MG/ML
INJECTION, EMULSION INTRAVENOUS AS NEEDED
Status: DISCONTINUED | OUTPATIENT
Start: 2023-12-27 | End: 2023-12-27

## 2023-12-27 RX ORDER — SODIUM CHLORIDE, SODIUM LACTATE, POTASSIUM CHLORIDE, CALCIUM CHLORIDE 600; 310; 30; 20 MG/100ML; MG/100ML; MG/100ML; MG/100ML
50 INJECTION, SOLUTION INTRAVENOUS CONTINUOUS
Status: DISCONTINUED | OUTPATIENT
Start: 2023-12-27 | End: 2023-12-27

## 2023-12-27 RX ADMIN — SODIUM CHLORIDE, SODIUM LACTATE, POTASSIUM CHLORIDE, CALCIUM CHLORIDE 50 ML/HR: 600; 310; 30; 20 INJECTION, SOLUTION INTRAVENOUS at 09:21

## 2023-12-27 RX ADMIN — SODIUM CHLORIDE, SODIUM LACTATE, POTASSIUM CHLORIDE, CALCIUM CHLORIDE: 600; 310; 30; 20 INJECTION, SOLUTION INTRAVENOUS at 09:51

## 2023-12-27 RX ADMIN — PROPOFOL 100 MG: 10 INJECTION, EMULSION INTRAVENOUS at 09:34

## 2023-12-27 RX ADMIN — PROPOFOL 100 MG: 10 INJECTION, EMULSION INTRAVENOUS at 09:38

## 2023-12-27 RX ADMIN — PROPOFOL 100 MG: 10 INJECTION, EMULSION INTRAVENOUS at 09:30

## 2023-12-27 RX ADMIN — PROPOFOL 50 MG: 10 INJECTION, EMULSION INTRAVENOUS at 09:41

## 2023-12-27 NOTE — DISCHARGE INSTRUCTIONS
- Follow up with your doctor within 2-3 days.   - Return to the ED for any new or worsening symptoms.   - Use amoxicillin 500mg 3x/day for 1 week  - Follow-up with [facility] within 2 days for further evaluation    Dental Pain    Dental pain (toothache) may be caused by many things including tooth decay (cavities or caries), abscess or infection, or trauma. If you were prescribed an antibiotic medicine, finish all of it even if you start to feel better. Rinsing your mouth with salt water or applying ice to the painful area of your face may help with the pain. Follow up with a dentist is important in ensuring good oral health and preventing the worsening of dental disease.    SEEK IMMEDIATE MEDICAL CARE IF YOU HAVE ANY OF THE FOLLOWING SYMPTOMS: unable to open your mouth, trouble breathing or swallowing, fever, or swelling of the face, neck, or jaw. Colonoscopy   WHAT YOU NEED TO KNOW:   A colonoscopy is a procedure to examine the inside of your colon (intestine) with a scope. Polyps or tissue growths may have been removed during your colonoscopy. It is normal to feel bloated and to have some abdominal discomfort. You should be passing gas. If you have hemorrhoids or you had polyps removed, you may have a small amount of bleeding.        DISCHARGE INSTRUCTIONS:   Seek care immediately if:   You have sudden, severe abdominal pain.     You have problems swallowing.     You have a large amount of black, sticky bowel movements or blood in your bowel movements.     You have sudden trouble breathing.     You feel weak, lightheaded, or faint or your heart beats faster than normal for you.     Contact your healthcare provider if:   You have a fever and chills.      You have nausea or are vomiting.      Your abdomen is bloated or feels full and hard.     You have abdominal pain.   You have black, sticky bowel movements or blood in your bowel movements.  You have not had a bowel movement for 3 days after your procedure.  You have rash or hives.  You have questions or concerns about your procedure.    Activity:   Do not lift, strain, or run for 24 hours after your procedure.     Rest after your procedure. You have been given medicine to relax you. Do not drive or make important decisions until the day after your procedure. Return to your normal activity as directed.     Relieve gas and discomfort from bloating by lying on your right side with a heating pad on your abdomen. You may need to take short walks to help the gas move out. Eat small meals until bloating is relieved.  Follow up with your healthcare provider as directed: Write down your questions so you remember to ask them during your visits.     If you take a “blood thinner”, please review the specific instructions from your endoscopist about when you should resume it. These can be found in the “Recommendation”  and “Your Medication list” sections of this After Visit Summary.

## 2023-12-27 NOTE — ANESTHESIA PREPROCEDURE EVALUATION
Procedure:  COLONOSCOPY    Prior attempted colonoscopy for significant weight loss with poor prep, now for repeat exam.  In the interim pt was diagnosed with lupus and started on meds including steroids - has regained weight, pain issues have resolved, he is overall much improved.      Relevant Problems   ANESTHESIA (within normal limits)      NEURO/PSYCH   (+) Anxiety      PULMONARY  Pt reports recent dx pleural effusion related to his lupus but no SOB/symptoms   (-) Sleep apnea   (-) Smoking   (-) URI (upper respiratory infection)      Other   (+) Lupus (HCC)      Physical Exam    Airway    Mallampati score: I  TM Distance: >3 FB  Neck ROM: full     Dental   No notable dental hx     Cardiovascular      Pulmonary      Other Findings        Anesthesia Plan  ASA Score- 3     Anesthesia Type- IV sedation with anesthesia with ASA Monitors.         Additional Monitors:     Airway Plan:            Plan Factors-Exercise tolerance (METS): >4 METS.    Chart reviewed.   Existing labs reviewed. Patient summary reviewed.    Patient is not a current smoker.              Induction- intravenous.    Postoperative Plan-     Informed Consent- Anesthetic plan and risks discussed with patient.  I personally reviewed this patient with the CRNA. Discussed and agreed on the Anesthesia Plan with the CRNA..

## 2023-12-27 NOTE — H&P
"History and Physical -  [facility]  Vincent Palomino 63 y.o. male MRN: [id number]    HPI: Vincent Palomino is a 63 y.o. year old male who presents for colon cancer screening, poor prep    REVIEW OF SYSTEMS: Per the HPI, and otherwise unremarkable.    Historical Information   Past Medical History:   Diagnosis Date    Anxiety 07/21/2023    from PCP    Bloating 07/21/2023    per PCP    Change in bowel habits     per PCP    Elevated LFTs 07/21/2023    from PCP    High cholesterol 07/21/2023    from PCP    Lupus (HCC) 12/27/2023    Stomach ulcer 07/21/2023    from PCP     Past Surgical History:   Procedure Laterality Date    COLONOSCOPY      EGD      HYDROCELE EXCISION / REPAIR Right 10/16/2021    testicular hydrocele/epididymitis    SALIVARY GLAND SURGERY  10/2018    [facility]     Social History   Social History     Substance and Sexual Activity   Alcohol Use Not Currently     Social History     Substance and Sexual Activity   Drug Use Never     Social History     Tobacco Use   Smoking Status Never   Smokeless Tobacco Never     Family History   Problem Relation Age of Onset    No Known Problems Mother     No Known Problems Father        Meds/Allergies       Current Outpatient Medications:     clonazePAM (KlonoPIN) 1 mg tablet    hydroxychloroquine (PLAQUENIL) 200 mg tablet    polyethylene glycol (GOLYTELY) 4000 mL solution    predniSONE 10 mg tablet    sucralfate (CARAFATE) 1 g tablet    pantoprazole (PROTONIX) 20 mg tablet    Current Facility-Administered Medications:     lactated ringers infusion, 50 mL/hr, Intravenous, Continuous, 50 mL/hr at 12/27/23 0921    No Known Allergies    Objective     /71   Pulse (!) 52   Temp (!) 97.1 °F (36.2 °C) (Temporal)   Resp 18   Ht 5' 6\" (1.676 m)   Wt 65.8 kg (145 lb)   SpO2 97%   BMI 23.40 kg/m²     PHYSICAL EXAM    Gen: NAD AAOx3  Head: Normocephalic, Atraumatic  CV: S1S2 RRR no m/r/g  CHEST: Clear b/l no c/r/w  ABD: soft, +BS NT/ND  EXT: no " edema    ASSESSMENT/PLAN:  This is a 63 y.o. year old male here for colonoscopy, and he is stable and optimized for his procedure.

## 2023-12-27 NOTE — ANESTHESIA POSTPROCEDURE EVALUATION
Post-Op Assessment Note    CV Status:  Stable  Pain Score: 0    Pain management: adequate       Mental Status:  Alert and awake   Hydration Status:  Euvolemic   PONV Controlled:  Controlled   Airway Patency:  Patent     Post Op Vitals Reviewed: Yes      Staff: CRNA               BP   92/53   Temp   98   Pulse  54   Resp   16   SpO2   99

## 2023-12-28 ENCOUNTER — TELEPHONE (OUTPATIENT)
Age: 63
End: 2023-12-28

## 2023-12-28 NOTE — TELEPHONE ENCOUNTER
Patients GI provider:  MARGAUX Salcedo    Number to return call: [phone]    Reason for call: Pt called stated he received a phone call. Pt did not listen to message. Pt had a colonoscopy 12/28/2023. Pt stated he will listen to message and call back if needed.    Scheduled procedure/appointment date if applicable: N/A